# Patient Record
Sex: MALE | Race: BLACK OR AFRICAN AMERICAN | NOT HISPANIC OR LATINO | URBAN - METROPOLITAN AREA
[De-identification: names, ages, dates, MRNs, and addresses within clinical notes are randomized per-mention and may not be internally consistent; named-entity substitution may affect disease eponyms.]

---

## 2023-04-06 ENCOUNTER — HOSPITAL ENCOUNTER (EMERGENCY)
Facility: HOSPITAL | Age: 48
Discharge: HOME | End: 2023-04-06
Attending: SPECIALIST
Payer: MEDICAID

## 2023-04-06 VITALS
BODY MASS INDEX: 21.48 KG/M2 | TEMPERATURE: 98 F | OXYGEN SATURATION: 96 % | WEIGHT: 145 LBS | HEART RATE: 105 BPM | DIASTOLIC BLOOD PRESSURE: 89 MMHG | RESPIRATION RATE: 18 BRPM | HEIGHT: 69 IN | SYSTOLIC BLOOD PRESSURE: 134 MMHG

## 2023-04-06 DIAGNOSIS — R19.7 DIARRHEA, UNSPECIFIED TYPE: Primary | ICD-10-CM

## 2023-04-06 PROCEDURE — 99282 EMERGENCY DEPT VISIT SF MDM: CPT

## 2023-04-06 NOTE — ED PROVIDER NOTES
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   Review of patient's allergies indicates:  No Known Allergies  No past medical history on file.  No past surgical history on file.  No family history on file.     Review of Systems   Constitutional: Negative.    HENT: Negative.     Respiratory: Negative.     Cardiovascular: Negative.    Gastrointestinal: Negative.    Genitourinary: Negative.    Musculoskeletal: Negative.    Neurological: Negative.      Physical Exam     Initial Vitals [04/06/23 0141]   BP Pulse Resp Temp SpO2   134/89 105 18 98.2 °F (36.8 °C) 96 %      MAP       --         Physical Exam    Nursing note and vitals reviewed.  Constitutional: He appears well-developed and well-nourished.   HENT:   Head: Normocephalic and atraumatic.   Eyes: EOM are normal. Pupils are equal, round, and reactive to light.   Neck: Neck supple.   Normal range of motion.  Cardiovascular:  Normal rate, regular rhythm and normal heart sounds.           Pulmonary/Chest: Breath sounds normal.   Abdominal: Abdomen is soft. He exhibits no distension. There is no abdominal tenderness. There is no guarding.   Musculoskeletal:         General: Normal range of motion.      Cervical back: Normal range of motion and neck supple.     Neurological: He is alert and oriented to person, place, and time.   Skin: Skin is warm and dry.       ED Course   Procedures  Labs Reviewed - No data to display       Imaging Results    None          Medications - No data to display  Medical Decision Making:   Differential Diagnosis:   Viral gastroenteritis, food  toxicity  ED Management:  Symptoms improved; discussed signs and symptoms of bacterial infectious diarrhea including blood in stool and fever and need to return to the emergency room if such symptoms develop                        Clinical Impression:   Final diagnoses:  [R19.7] Diarrhea, unspecified type (Primary)        ED Disposition Condition    Discharge Stable          ED Prescriptions    None       Follow-up Information       Follow up With Specialties Details Why Contact Info    [facility] Emergency Medicine  As needed [address] [facility] [id number] [phone]             Tom Ibarra MD  04/06/23 2879

## 2023-04-06 NOTE — Clinical Note
"Yamile "Katparish Greene was seen and treated in our emergency department on 4/6/2023.  He may return to work on 04/10/2023.       If you have any questions or concerns, please don't hesitate to call.      Tom Ibarra MD"

## 2023-07-12 ENCOUNTER — HOSPITAL ENCOUNTER (EMERGENCY)
Facility: HOSPITAL | Age: 48
Discharge: HOME | End: 2023-07-12
Attending: SPECIALIST
Payer: MEDICAID

## 2023-07-12 VITALS
BODY MASS INDEX: 22.22 KG/M2 | TEMPERATURE: 99 F | SYSTOLIC BLOOD PRESSURE: 112 MMHG | HEIGHT: 69 IN | DIASTOLIC BLOOD PRESSURE: 69 MMHG | HEART RATE: 98 BPM | WEIGHT: 150 LBS | OXYGEN SATURATION: 98 % | RESPIRATION RATE: 20 BRPM

## 2023-07-12 DIAGNOSIS — A08.4 VIRAL GASTROENTERITIS: Primary | ICD-10-CM

## 2023-07-12 PROCEDURE — 99282 EMERGENCY DEPT VISIT SF MDM: CPT

## 2023-07-12 NOTE — Clinical Note
"Yamile "Katparish Greene was seen and treated in our emergency department on 7/12/2023.  He may return to work on 07/13/2023.       If you have any questions or concerns, please don't hesitate to call.      Tom Ibarra MD"

## 2023-07-12 NOTE — ED PROVIDER NOTES
Encounter Date: 7/12/2023       History     Chief Complaint   Patient presents with    Diarrhea     Abd cramping and diarrhea for 3 days. No meds for symptoms. No n/v, no fever.      Patient states he had abdominal cramping with diarrhea and nausea over the last 3 days but is better and needs a work excuse; no fever, no abdominal pain    The history is provided by the patient.   Review of patient's allergies indicates:  No Known Allergies  No past medical history on file.  No past surgical history on file.  No family history on file.     Review of Systems   Constitutional: Negative.    HENT: Negative.     Respiratory: Negative.     Cardiovascular: Negative.    Gastrointestinal: Negative.    Genitourinary: Negative.    Musculoskeletal: Negative.    Neurological: Negative.      Physical Exam     Initial Vitals [07/12/23 1742]   BP Pulse Resp Temp SpO2   112/69 98 20 98.8 °F (37.1 °C) 98 %      MAP       --         Physical Exam    Nursing note and vitals reviewed.  Constitutional: He appears well-developed and well-nourished.   HENT:   Head: Normocephalic and atraumatic.   Eyes: EOM are normal. Pupils are equal, round, and reactive to light.   Neck: Neck supple.   Normal range of motion.  Cardiovascular:  Normal rate, regular rhythm and normal heart sounds.           Pulmonary/Chest: Breath sounds normal.   Abdominal: Abdomen is soft. There is no abdominal tenderness.   Musculoskeletal:         General: Normal range of motion.      Cervical back: Normal range of motion and neck supple.     Neurological: He is alert and oriented to person, place, and time.   Skin: Skin is warm and dry.       ED Course   Procedures  Labs Reviewed - No data to display       Imaging Results    None          Medications - No data to display  Medical Decision Making:   Initial Assessment:   Patient states he had abdominal cramping with diarrhea and nausea over the last 3 days but is better and needs a work excuse; no fever, no abdominal  pain  Differential Diagnosis:   Recent viral gastroenteritis  ED Management:  Patient improved prior to arrival and needs a work excuse; encouraged hydration, Pepto-Bismol as needed                        Clinical Impression:   Final diagnoses:  [A08.4] Viral gastroenteritis (Primary)        ED Disposition Condition    Discharge Stable          ED Prescriptions    None       Follow-up Information       Follow up With Specialties Details Why Contact Info    [facility]  As needed [address] [facility] [id number] [phone]             Tom Ibarra MD  07/12/23 6955

## 2023-09-26 ENCOUNTER — HOSPITAL ENCOUNTER (EMERGENCY)
Facility: HOSPITAL | Age: 48
Discharge: HOME | End: 2023-09-26
Attending: SPECIALIST
Payer: MEDICAID

## 2023-09-26 VITALS
OXYGEN SATURATION: 97 % | DIASTOLIC BLOOD PRESSURE: 76 MMHG | HEIGHT: 69 IN | SYSTOLIC BLOOD PRESSURE: 124 MMHG | WEIGHT: 150 LBS | BODY MASS INDEX: 22.22 KG/M2 | HEART RATE: 78 BPM | TEMPERATURE: 99 F | RESPIRATION RATE: 18 BRPM

## 2023-09-26 DIAGNOSIS — Z02.89 ENCOUNTER TO OBTAIN EXCUSE FROM WORK: Primary | ICD-10-CM

## 2023-09-26 PROCEDURE — 99282 EMERGENCY DEPT VISIT SF MDM: CPT

## 2023-09-26 NOTE — ED PROVIDER NOTES
Encounter Date: 9/26/2023       History     Chief Complaint   Patient presents with    Letter for School/Work     Reports he had a virus yesterday so he missed work. Reports he is here to get a work excuse     Patient presents emergency room for return to work    The history is provided by the patient. No  was used.     Review of patient's allergies indicates:  No Known Allergies  History reviewed. No pertinent past medical history.  Past Surgical History:   Procedure Laterality Date    TONSILLECTOMY       No family history on file.  Social History     Tobacco Use    Smoking status: Every Day     Types: Cigarettes    Smokeless tobacco: Never     Review of Systems   Constitutional: Negative.    HENT: Negative.     Eyes: Negative.    Respiratory: Negative.     Cardiovascular: Negative.    Gastrointestinal: Negative.    Endocrine: Negative.    Genitourinary: Negative.    Musculoskeletal: Negative.    Skin: Negative.    Allergic/Immunologic: Negative.    Neurological: Negative.    Hematological: Negative.    Psychiatric/Behavioral: Negative.     All other systems reviewed and are negative.      Physical Exam     Initial Vitals [09/26/23 1814]   BP Pulse Resp Temp SpO2   124/76 78 18 98.5 °F (36.9 °C) 97 %      MAP       --         Physical Exam    Nursing note and vitals reviewed.  Constitutional: He appears well-developed and well-nourished.   HENT:   Head: Normocephalic and atraumatic.   Right Ear: External ear normal.   Left Ear: External ear normal.   Nose: Nose normal.   Mouth/Throat: Oropharynx is clear and moist.   Eyes: Conjunctivae and EOM are normal. Pupils are equal, round, and reactive to light.   Neck: Neck supple.   Normal range of motion.  Cardiovascular:  Normal rate, regular rhythm, normal heart sounds and intact distal pulses.           Pulmonary/Chest: Breath sounds normal.   Abdominal: Abdomen is soft. Bowel sounds are normal.   Musculoskeletal:         General: Normal range of  motion.      Cervical back: Normal range of motion and neck supple.     Neurological: He is alert and oriented to person, place, and time. He has normal strength and normal reflexes. GCS score is 15. GCS eye subscore is 4. GCS verbal subscore is 5. GCS motor subscore is 6.   Skin: Skin is warm and dry. Capillary refill takes less than 2 seconds.   Psychiatric: He has a normal mood and affect. His behavior is normal. Judgment and thought content normal.         ED Course   Procedures  Labs Reviewed - No data to display       Imaging Results    None          Medications - No data to display  Medical Decision Making  Awake alert and oriented no acute distress 48-year-old male presents to the emergency room for return to work excuse.  Patient denies any complaints.  All review of systems within normal limits.  GCS 15 cranial nerves 2-12 intact skin warm dry intact.                               Clinical Impression:   Final diagnoses:  [Z02.89] Encounter to obtain excuse from work (Primary)        ED Disposition Condition    Discharge Stable          ED Prescriptions    None       Follow-up Information    None          Mendy Tyler NP  09/26/23 1477

## 2023-09-26 NOTE — DISCHARGE INSTRUCTIONS
Patient to be discharged home.  Tylenol Motrin for any pain or discomfort.  Follow up with primary care provider as needed

## 2023-09-26 NOTE — Clinical Note
"Yamile "Katparish Greene was seen and treated in our emergency department on 9/26/2023.  He may return to work on 09/27/2023.       If you have any questions or concerns, please don't hesitate to call.      Tom Ibarra MD"

## 2024-05-29 ENCOUNTER — HOSPITAL ENCOUNTER (EMERGENCY)
Facility: HOSPITAL | Age: 49
Discharge: HOME | End: 2024-05-29
Attending: STUDENT IN AN ORGANIZED HEALTH CARE EDUCATION/TRAINING PROGRAM
Payer: COMMERCIAL

## 2024-05-29 ENCOUNTER — TELEPHONE (OUTPATIENT)
Dept: FAMILY MEDICINE | Facility: CLINIC | Age: 49
End: 2024-05-29
Payer: COMMERCIAL

## 2024-05-29 VITALS
HEIGHT: 69 IN | WEIGHT: 147 LBS | BODY MASS INDEX: 21.77 KG/M2 | TEMPERATURE: 98 F | SYSTOLIC BLOOD PRESSURE: 107 MMHG | RESPIRATION RATE: 18 BRPM | OXYGEN SATURATION: 96 % | HEART RATE: 69 BPM | DIASTOLIC BLOOD PRESSURE: 71 MMHG

## 2024-05-29 DIAGNOSIS — N43.3 HYDROCELE, UNSPECIFIED HYDROCELE TYPE: ICD-10-CM

## 2024-05-29 DIAGNOSIS — N50.89 TESTICULAR SWELLING: Primary | ICD-10-CM

## 2024-05-29 DIAGNOSIS — N45.3 EPIDIDYMO-ORCHITIS: ICD-10-CM

## 2024-05-29 LAB
BACTERIA #/AREA URNS AUTO: NORMAL /HPF
BILIRUB UR QL STRIP.AUTO: NEGATIVE
C TRACH DNA SPEC QL NAA+PROBE: NOT DETECTED
CLARITY UR: CLEAR
COLOR UR AUTO: YELLOW
GLUCOSE UR QL STRIP: NEGATIVE
HGB UR QL STRIP: NEGATIVE
KETONES UR QL STRIP: NEGATIVE
LEUKOCYTE ESTERASE UR QL STRIP: NEGATIVE
N GONORRHOEA DNA SPEC QL NAA+PROBE: NOT DETECTED
NITRITE UR QL STRIP: NEGATIVE
PH UR STRIP: 7 [PH]
PROT UR QL STRIP: ABNORMAL
RBC #/AREA URNS AUTO: NORMAL /HPF
SOURCE (OHS): NORMAL
SP GR UR STRIP.AUTO: 1.02 (ref 1–1.03)
SQUAMOUS #/AREA URNS AUTO: NORMAL /HPF
UROBILINOGEN UR STRIP-ACNC: 1
WBC #/AREA URNS AUTO: NORMAL /HPF

## 2024-05-29 PROCEDURE — 63700000 PHARM REV CODE 250 ALT 637 W/O HCPCS: Performed by: STUDENT IN AN ORGANIZED HEALTH CARE EDUCATION/TRAINING PROGRAM

## 2024-05-29 PROCEDURE — 96365 THER/PROPH/DIAG IV INF INIT: CPT

## 2024-05-29 PROCEDURE — 99285 EMERGENCY DEPT VISIT HI MDM: CPT | Mod: 25

## 2024-05-29 PROCEDURE — 81003 URINALYSIS AUTO W/O SCOPE: CPT | Performed by: STUDENT IN AN ORGANIZED HEALTH CARE EDUCATION/TRAINING PROGRAM

## 2024-05-29 PROCEDURE — 87491 CHLMYD TRACH DNA AMP PROBE: CPT | Performed by: STUDENT IN AN ORGANIZED HEALTH CARE EDUCATION/TRAINING PROGRAM

## 2024-05-29 PROCEDURE — 87591 N.GONORRHOEAE DNA AMP PROB: CPT | Performed by: STUDENT IN AN ORGANIZED HEALTH CARE EDUCATION/TRAINING PROGRAM

## 2024-05-29 PROCEDURE — 63600175 PHARM REV CODE 636 W HCPCS: Performed by: STUDENT IN AN ORGANIZED HEALTH CARE EDUCATION/TRAINING PROGRAM

## 2024-05-29 PROCEDURE — 25000003 PHARM REV CODE 250: Performed by: STUDENT IN AN ORGANIZED HEALTH CARE EDUCATION/TRAINING PROGRAM

## 2024-05-29 PROCEDURE — 81001 URINALYSIS AUTO W/SCOPE: CPT | Performed by: STUDENT IN AN ORGANIZED HEALTH CARE EDUCATION/TRAINING PROGRAM

## 2024-05-29 RX ORDER — AZITHROMYCIN 500 MG/1
500 TABLET, FILM COATED ORAL DAILY
Qty: 9 TABLET | Refills: 0 | Status: SHIPPED | OUTPATIENT
Start: 2024-05-29 | End: 2024-05-29 | Stop reason: CLARIF

## 2024-05-29 RX ORDER — AZITHROMYCIN 250 MG/1
500 TABLET, FILM COATED ORAL
Status: COMPLETED | OUTPATIENT
Start: 2024-05-29 | End: 2024-05-29

## 2024-05-29 RX ORDER — DOXYCYCLINE 100 MG/1
100 CAPSULE ORAL 2 TIMES DAILY
Qty: 20 CAPSULE | Refills: 0 | Status: SHIPPED | OUTPATIENT
Start: 2024-05-29 | End: 2024-06-03

## 2024-05-29 RX ADMIN — CEFTRIAXONE SODIUM 1 G: 1 INJECTION, POWDER, FOR SOLUTION INTRAMUSCULAR; INTRAVENOUS at 02:05

## 2024-05-29 RX ADMIN — AZITHROMYCIN DIHYDRATE 500 MG: 250 TABLET ORAL at 02:05

## 2024-05-29 NOTE — ED PROVIDER NOTES
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  States that he was happened 5 days ago when [place].  He denies any trauma to the testicle itself.  He denies any penile discharge or blood in the urine.  He was sexually active right [place].  He denies his partner having any sort of STI recently.  No fevers either.        Review of patient's allergies indicates:  No Known Allergies  No past medical history on file.  Past Surgical History:   Procedure Laterality Date    TONSILLECTOMY       No family history on file.  Social History     Tobacco Use    Smoking status: Every Day     Types: Cigarettes    Smokeless tobacco: Never     Review of Systems   Constitutional:  Negative for fever.   HENT:  Negative for sore throat.    Respiratory:  Negative for shortness of breath.    Cardiovascular:  Negative for chest pain.   Gastrointestinal:  Negative for nausea.   Genitourinary:  Positive for scrotal swelling. Negative for dysuria.   Musculoskeletal:  Negative for back pain.   Skin:  Negative for rash.   Neurological:  Negative for weakness.   Hematological:  Does not bruise/bleed easily.       Physical Exam     Initial Vitals [05/29/24 1216]   BP Pulse Resp Temp SpO2   (!) 128/94 88 16 98.4 °F (36.9 °C) 97 %      MAP       --         Physical Exam    Constitutional: He appears well-developed and well-nourished. He is not diaphoretic. No distress.   HENT:   Head: Normocephalic and atraumatic.   Eyes: Conjunctivae and EOM are normal.  Pupils are equal, round, and reactive to light.   Neck: No thyromegaly present. No tracheal deviation present.   Normal range of motion.  Cardiovascular:  Normal rate and regular rhythm.     Exam reveals no gallop and no friction rub.       No murmur heard.  Pulmonary/Chest: Breath sounds normal. No stridor. No respiratory distress. He has no wheezes. He has no rhonchi. He has no rales.   Abdominal: Abdomen is soft. Bowel sounds are normal. He exhibits no distension. There is no abdominal tenderness.   Genitourinary:    Genitourinary Comments: Performed with a chaperone in the room.  Left testicular swelling appreciated with tenderness at the head at the epididymal region.  No penile discharge noted.  No erythema noted.  No crepitus noted.     Musculoskeletal:         General: Normal range of motion.      Cervical back: Normal range of motion.     Neurological: He is alert and oriented to person, place, and time.   Skin: Skin is warm. Capillary refill takes less than 2 seconds.   Psychiatric: He has a normal mood and affect.         ED Course   Procedures  Labs Reviewed   URINALYSIS, REFLEX TO URINE CULTURE - Abnormal; Notable for the following components:       Result Value    Protein, UA Trace (*)     All other components within normal limits   URINALYSIS, MICROSCOPIC - Normal   CHLAMYDIA/GONORRHOEAE(GC), PCR    Narrative:     The Xpert CT/NG test, performed on the GeneXpert system is a qualitative in vitro real-time polymerase chain reaction (PCR) test for the automated detected and differentiation for genomic DNA from Chlamydia trachomatis (CT) and/or Neisseria gonorrhoeae (NG).          Imaging Results              US Scrotum And Testicles (Final result)  Result time 05/29/24 13:15:27      Final result by Silvino Thomas MD (05/29/24 13:15:27)                   Impression:      Left epididymoorchitis with complex left hydrocele.      Electronically signed by: Silvino Thomas  Date:    05/29/2024  Time:    13:15                Narrative:    EXAMINATION:  US SCROTUM AND TESTICLES    CLINICAL HISTORY:  Other specified disorders of the male genital organs    TECHNIQUE:  Gray-scale, color Doppler, and spectral waveform tracings of the scrotum.    COMPARISON:  No relevant comparison studies available at the time of dictation.    FINDINGS:  No intratesticular mass identified. Hypervascular left testicle and left epididymis with left epididymal enlargement.  Septated small volume left hydrocele.    Measurements:    - right testicle: 3.6 x 1.8 x 2.5 cm    - left testicle: 3.7 x 2.9 x 2.9 cm                                       Medications   cefTRIAXone (Rocephin) 1 g in dextrose 5 % in water (D5W) 100 mL IVPB (MB+) (0 g Intravenous Stopped 5/29/24 1518)   azithromycin tablet 500 mg (500 mg Oral Given 5/29/24 1449)     Medical Decision Making  49 year old male here with testicular swelling   Diff includes torsion, epididymitis, orchitis, abscess, hydrocele, malignancy   U/S showed evidence of epididymitis   Have given treatment here with rocephin/azithro.  Plan to discharge with doxy po bid   Also, since there is a small septated hydrocele, did tell patient this will require a urology follow up, he stated that his wife already made him an appointment for this Friday.     At this time, I have discussed the diagnoses with the patient and they have verbalized understanding back to me.  I have also discussed the steps to take at home going forward.  They agree with the plan.  After the discussion, all their questions were appropriately answered.  I have also discussed strict return precautions with the patient.     Aislinn Denton MD         Amount and/or Complexity of Data Reviewed  Radiology: ordered.    Risk  Prescription drug management.  Diagnosis or treatment significantly limited by social determinants of health.                                      Clinical Impression:  Final diagnoses:  [N50.89] Testicular swelling  (Primary)  [N45.3] Epididymo-orchitis  [N43.3] Hydrocele, unspecified hydrocele type          ED Disposition Condition    Discharge Stable          ED Prescriptions       Medication Sig Dispense Start Date End Date Auth. Provider    azithromycin (ZITHROMAX) 500 MG tablet  (Status: Discontinued) Take 1 tablet (500 mg total) by mouth once daily. for 9 days 9 tablet 5/29/2024 5/29/2024 Aislinn Denton MD    doxycycline (VIBRAMYCIN) 100 MG Cap Take 1 capsule (100 mg total) by mouth 2 (two) times daily. for 10 days 20 capsule 5/29/2024 6/8/2024 Aislinn Denton MD          Follow-up Information       Follow up With Specialties Details Why Contact Info    [facility] - Emergency Dept Emergency Medicine Go to  As needed, If symptoms worsen [address] [facility] [id number] [phone]             Aislinn Denton MD  05/30/24 5017

## 2024-05-29 NOTE — DISCHARGE INSTRUCTIONS
Return to the emergency department for any worsening or concerning symptoms.  Please follow up with the urologist as discussed on Friday.  Otherwise, please follow up with the primary care provider as well.

## 2024-06-03 ENCOUNTER — OFFICE VISIT (OUTPATIENT)
Dept: FAMILY MEDICINE | Facility: CLINIC | Age: 49
End: 2024-06-03
Payer: COMMERCIAL

## 2024-06-03 VITALS
HEART RATE: 90 BPM | BODY MASS INDEX: 20.44 KG/M2 | HEIGHT: 71 IN | SYSTOLIC BLOOD PRESSURE: 114 MMHG | WEIGHT: 146 LBS | DIASTOLIC BLOOD PRESSURE: 82 MMHG | TEMPERATURE: 98 F | RESPIRATION RATE: 20 BRPM | OXYGEN SATURATION: 99 %

## 2024-06-03 DIAGNOSIS — Z11.59 ENCOUNTER FOR HEPATITIS C SCREENING TEST FOR LOW RISK PATIENT: ICD-10-CM

## 2024-06-03 DIAGNOSIS — Z23 NEED FOR VACCINATION FOR STREP PNEUMONIAE: ICD-10-CM

## 2024-06-03 DIAGNOSIS — Z76.89 ENCOUNTER TO ESTABLISH CARE: ICD-10-CM

## 2024-06-03 DIAGNOSIS — Z00.00 WELLNESS EXAMINATION: ICD-10-CM

## 2024-06-03 DIAGNOSIS — Z11.4 ENCOUNTER FOR SCREENING FOR HIV: ICD-10-CM

## 2024-06-03 DIAGNOSIS — N45.3 EPIDIDYMOORCHITIS: ICD-10-CM

## 2024-06-03 DIAGNOSIS — Z23 NEED FOR TETANUS, DIPHTHERIA, AND ACELLULAR PERTUSSIS (TDAP) VACCINE: ICD-10-CM

## 2024-06-03 DIAGNOSIS — Z12.11 ENCOUNTER FOR SCREENING FOR MALIGNANT NEOPLASM OF COLON: ICD-10-CM

## 2024-06-03 DIAGNOSIS — Z13.1 ENCOUNTER FOR SCREENING FOR DIABETES MELLITUS: ICD-10-CM

## 2024-06-03 DIAGNOSIS — Z13.29 SCREENING FOR THYROID DISORDER: ICD-10-CM

## 2024-06-03 DIAGNOSIS — Z00.00 WELL ADULT HEALTH CHECK: ICD-10-CM

## 2024-06-03 DIAGNOSIS — K59.00 CONSTIPATION, UNSPECIFIED CONSTIPATION TYPE: ICD-10-CM

## 2024-06-03 DIAGNOSIS — Z13.6 ENCOUNTER FOR LIPID SCREENING FOR CARDIOVASCULAR DISEASE: ICD-10-CM

## 2024-06-03 DIAGNOSIS — N43.3 HYDROCELE IN ADULT: ICD-10-CM

## 2024-06-03 DIAGNOSIS — Z13.220 ENCOUNTER FOR LIPID SCREENING FOR CARDIOVASCULAR DISEASE: ICD-10-CM

## 2024-06-03 PROCEDURE — 90715 TDAP VACCINE 7 YRS/> IM: CPT | Mod: ,,, | Performed by: STUDENT IN AN ORGANIZED HEALTH CARE EDUCATION/TRAINING PROGRAM

## 2024-06-03 PROCEDURE — 90472 IMMUNIZATION ADMIN EACH ADD: CPT | Mod: ,,, | Performed by: STUDENT IN AN ORGANIZED HEALTH CARE EDUCATION/TRAINING PROGRAM

## 2024-06-03 PROCEDURE — 90471 IMMUNIZATION ADMIN: CPT | Mod: ,,, | Performed by: STUDENT IN AN ORGANIZED HEALTH CARE EDUCATION/TRAINING PROGRAM

## 2024-06-03 PROCEDURE — 3008F BODY MASS INDEX DOCD: CPT | Mod: CPTII,,, | Performed by: STUDENT IN AN ORGANIZED HEALTH CARE EDUCATION/TRAINING PROGRAM

## 2024-06-03 PROCEDURE — 3074F SYST BP LT 130 MM HG: CPT | Mod: CPTII,,, | Performed by: STUDENT IN AN ORGANIZED HEALTH CARE EDUCATION/TRAINING PROGRAM

## 2024-06-03 PROCEDURE — 1159F MED LIST DOCD IN RCRD: CPT | Mod: CPTII,,, | Performed by: STUDENT IN AN ORGANIZED HEALTH CARE EDUCATION/TRAINING PROGRAM

## 2024-06-03 PROCEDURE — 3079F DIAST BP 80-89 MM HG: CPT | Mod: CPTII,,, | Performed by: STUDENT IN AN ORGANIZED HEALTH CARE EDUCATION/TRAINING PROGRAM

## 2024-06-03 PROCEDURE — 1160F RVW MEDS BY RX/DR IN RCRD: CPT | Mod: CPTII,,, | Performed by: STUDENT IN AN ORGANIZED HEALTH CARE EDUCATION/TRAINING PROGRAM

## 2024-06-03 PROCEDURE — 99386 PREV VISIT NEW AGE 40-64: CPT | Mod: 25,,, | Performed by: STUDENT IN AN ORGANIZED HEALTH CARE EDUCATION/TRAINING PROGRAM

## 2024-06-03 PROCEDURE — 90677 PCV20 VACCINE IM: CPT | Mod: ,,, | Performed by: STUDENT IN AN ORGANIZED HEALTH CARE EDUCATION/TRAINING PROGRAM

## 2024-06-03 PROCEDURE — 99406 BEHAV CHNG SMOKING 3-10 MIN: CPT | Mod: 25,,, | Performed by: STUDENT IN AN ORGANIZED HEALTH CARE EDUCATION/TRAINING PROGRAM

## 2024-06-03 RX ORDER — IBUPROFEN 200 MG
200 TABLET ORAL EVERY 6 HOURS PRN
COMMUNITY

## 2024-06-03 RX ORDER — AZITHROMYCIN 500 MG/1
500 TABLET, FILM COATED ORAL
COMMUNITY
Start: 2024-05-29

## 2024-06-03 RX ORDER — SULFAMETHOXAZOLE AND TRIMETHOPRIM 800; 160 MG/1; MG/1
1 TABLET ORAL
COMMUNITY

## 2024-06-03 RX ORDER — POLYETHYLENE GLYCOL 3350 17 G/17G
POWDER, FOR SOLUTION ORAL
COMMUNITY

## 2024-06-03 RX ORDER — DOCUSATE SODIUM 100 MG/1
100 CAPSULE, LIQUID FILLED ORAL 2 TIMES DAILY
Qty: 180 CAPSULE | Refills: 0 | Status: SHIPPED | OUTPATIENT
Start: 2024-06-03 | End: 2024-09-01

## 2024-06-03 NOTE — PROGRESS NOTES
Patient ID: [id number]     Chief Complaint: Establish Care        HPI:      Disclaimer:  This note is prepared using voice recognition software and as such is likely to have errors despite attempts at proofreading. Please contact me for questions.     Yamile Greene is a 49 y.o. male here today for an annual wellness.       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  The size of the testicle started increasing.  He recently had an ultrasound of scrotum which demonstrated epididymo-orchitis with hydrocele.  He saw Dr. Greene (urology) today.  Was prescribed azithromycin and doxycycline.  Did not see any improvement.  Dr. Greene as prescribed him Bactrim DS today.  Otherwise doing well.    Constipation  Has been taking on and off MiraLax    Smoking      Chronic issues-    No past medical history on file.     Past Surgical History:   Procedure Laterality Date    TONSILLECTOMY         Review of patient's allergies indicates:  No Known Allergies    Current Outpatient Medications   Medication Instructions    azithromycin (ZITHROMAX) 500 mg, Oral    doxycycline (VIBRAMYCIN) 100 mg, Oral, 2 times daily    ibuprofen (ADVIL,MOTRIN) 200 mg, Oral, Every 6 hours PRN    polyethylene glycol (GLYCOLAX) 17 gram PwPk Oral    sulfamethoxazole-trimethoprim 800-160mg (BACTRIM DS) 800-160 mg Tab 1 tablet, Oral, Every 12 hours (non-standard times)       Social History     Socioeconomic History    Marital status:    Tobacco Use    Smoking status: Some Days     Types: Cigarettes    Smokeless tobacco: Never   Substance and Sexual Activity    Alcohol use: Yes     Comment: occassionally    Drug use: Yes     Types: Cocaine    Sexual activity: Yes     Partners: Female        Family History   Problem Relation Name Age of Onset    Cancer Mother      Heart disease Mother      Depression Mother          Patient Care Team:  No, Primary Doctor as PCP -  "General     Subjective:     ROS    See HPI for details    Constitutional: Denies Change in appetite. Denies Chills. Denies Fever. Denies Night sweats.  Respiratory: Denies Cough. Denies Shortness of breath. Denies Shortness of breath with exertion. Denies Wheezing.  Cardiovascular: DeniesChest pain at rest. Denies Chest pain with exertion. Denies Irregular heartbeat. Denies Palpitations. Denies Edema.  Gastrointestinal: Denies Abdominal pain. DeniesDiarrhea. Denies Nausea. Denies Vomiting. Denies Hematemesis or Hematochezia.  Genitourinary: Denies Dysuria. Denies Urinary frequency. Denies Urinary urgency. Denies Blood in urine.  Psychiatric: Denies Depression. Denies Anxiety. Denies Suicidal/Homicidal ideations.    All Other ROS: Negative except as stated in HPI.       Objective:     Visit Vitals  /82 (BP Location: Right arm, Patient Position: Sitting, BP Method: Large (Automatic))   Pulse 90   Temp 98.1 °F (36.7 °C) (Oral)   Resp 20   Ht 5' 10.67" (1.795 m)   Wt 66.2 kg (146 lb)   SpO2 99%   BMI 20.55 kg/m²       Physical Exam    General: Alert and oriented, No acute distress.  Neck/Thyroid: Supple, Non-tender  Respiratory: Clear to auscultation bilaterally  Cardiovascular: Regular rate and rhythm  Gastrointestinal: Soft, Non-tender. No palpable organomegaly.  Musculoskeletal: Normal range of motion.  Neurologic: No focal deficits  Psychiatric: Normal interaction, Coherent speech, Euthymic mood, Appropriate affect     Assessment:       ICD-10-CM ICD-9-CM   1. Encounter to establish care  Z76.89 V65.8   2. Wellness examination  Z00.00 V70.0   3. Encounter for screening for diabetes mellitus  Z13.1 V77.1   4. Screening for thyroid disorder  Z13.29 V77.0   5. Encounter for lipid screening for cardiovascular disease  Z13.220 V77.91    Z13.6 V81.2        Plan:       Health Maintenance Topics with due status: Not Due       Topic Last Completion Date    Influenza Vaccine Not Due          Vaccinations -   There is no " immunization history on file for this patient.     1. Encounter to establish care  2. Wellness examination  -     CBC Auto Differential; Future; Expected date: 06/03/2024  -     Comprehensive Metabolic Panel; Future; Expected date: 06/03/2024  -     Urinalysis; Future; Expected date: 06/03/2024  -     PSA, Screening; Future; Expected date: 06/03/2024  AAA Screening -  (65-75)   Lung Cancer-(50-80) Smoker/ Ex smoker- smokes  Prostate Cancer Screening (50-74)- ordered  Colon Cancer Screening(45-75) - Cologuard ordered  Eye Exam - Recommend annually.   Dental Exam - Recommend biannual exams.     Diet discussed (healthy food choices, reduce portions and overall calorie intake)  Exercise 30-45 minutes 5x per week  Avoid excessive alcohol and tobacco if taking  Immunizations dicussed  Monthly testicular exam recommended  Preventative exams discussed.       3. Encounter for screening for diabetes mellitus  -     Hemoglobin A1C; Future; Expected date: 06/03/2024    4. Screening for thyroid disorder  -     TSH; Future; Expected date: 06/03/2024    5. Encounter for lipid screening for cardiovascular disease  -     Lipid Panel; Future; Expected date: 06/03/2024    6. Encounter for screening for HIV  -     HIV 1/2 Ag/Ab (4th Gen); Future; Expected date: 06/03/2024    7. Encounter for hepatitis C screening test for low risk patient  -     Hepatitis C Antibody; Future; Expected date: 06/03/2024    8. Epididymoorchitis  9. Hydrocele in adult  Continue antibiotics as prescribed   Keep scheduled visit with Dr. Mendez his   ER precautions provided    10. Need for vaccination for Strep pneumoniae  -     Pneumococcal Conjugate Vaccine (20 Valent) (IM)(Preferred)    11. Need for tetanus, diphtheria, and acellular pertussis (Tdap) vaccine  -     Tdap Vaccine    12. Encounter for screening for malignant neoplasm of colon  -     Cologuard Screening (Multitarget Stool DNA); Future; Expected date: 06/03/2024    13. Constipation, unspecified  constipation type  -     docusate sodium (COLACE) 100 MG capsule; Take 1 capsule (100 mg total) by mouth 2 (two) times daily.  Dispense: 180 capsule; Refill: 0    14. Well adult health check  -     CBC Auto Differential; Future; Expected date: 06/03/2025  -     Comprehensive Metabolic Panel; Future; Expected date: 06/03/2025  -     Lipid Panel; Future; Expected date: 06/03/2025  -     TSH; Future; Expected date: 06/03/2025  -     Hemoglobin A1C; Future; Expected date: 06/03/2025  -     Urinalysis; Future; Expected date: 06/03/2025          Medication List with Changes/Refills   Current Medications    AZITHROMYCIN (ZITHROMAX) 500 MG TABLET    Take 500 mg by mouth.       Start Date: 5/29/2024 End Date: --    DOXYCYCLINE (VIBRAMYCIN) 100 MG CAP    Take 1 capsule (100 mg total) by mouth 2 (two) times daily. for 10 days       Start Date: 5/29/2024 End Date: 6/8/2024    IBUPROFEN (ADVIL,MOTRIN) 200 MG TABLET    Take 200 mg by mouth every 6 (six) hours as needed for Pain.       Start Date: --        End Date: --    POLYETHYLENE GLYCOL (GLYCOLAX) 17 GRAM PWPK    Take by mouth.       Start Date: --        End Date: --    SULFAMETHOXAZOLE-TRIMETHOPRIM 800-160MG (BACTRIM DS) 800-160 MG TAB    Take 1 tablet by mouth every 12 (twelve) hours.       Start Date: --        End Date: --          The patient's Health Maintenance was reviewed and the following appears to be due at this time:   Health Maintenance Due   Topic Date Due    Hepatitis C Screening  Never done    Lipid Panel  Never done    Pneumococcal Vaccines (Age 0-64) (1 of 2 - PCV) Never done    HIV Screening  Never done    TETANUS VACCINE  Never done    Colorectal Cancer Screening  Never done    COVID-19 Vaccine (3 - 2023-24 season) 09/01/2023       Follow up in about 1 year (around 6/3/2025) for Annual Wellness.   In addition to their scheduled follow up, the patient has also been instructed to follow up on as needed basis.

## 2024-06-04 ENCOUNTER — LAB VISIT (OUTPATIENT)
Dept: LAB | Facility: HOSPITAL | Age: 49
End: 2024-06-04
Attending: STUDENT IN AN ORGANIZED HEALTH CARE EDUCATION/TRAINING PROGRAM
Payer: COMMERCIAL

## 2024-06-04 DIAGNOSIS — Z13.220 ENCOUNTER FOR LIPID SCREENING FOR CARDIOVASCULAR DISEASE: ICD-10-CM

## 2024-06-04 DIAGNOSIS — D64.9 ANEMIA, UNSPECIFIED TYPE: Primary | ICD-10-CM

## 2024-06-04 DIAGNOSIS — Z13.29 SCREENING FOR THYROID DISORDER: ICD-10-CM

## 2024-06-04 DIAGNOSIS — R77.1 ELEVATED SERUM GLOBULIN LEVEL: ICD-10-CM

## 2024-06-04 DIAGNOSIS — Z00.00 WELLNESS EXAMINATION: ICD-10-CM

## 2024-06-04 DIAGNOSIS — Z11.59 ENCOUNTER FOR HEPATITIS C SCREENING TEST FOR LOW RISK PATIENT: ICD-10-CM

## 2024-06-04 DIAGNOSIS — Z11.4 ENCOUNTER FOR SCREENING FOR HIV: ICD-10-CM

## 2024-06-04 DIAGNOSIS — Z13.6 ENCOUNTER FOR LIPID SCREENING FOR CARDIOVASCULAR DISEASE: ICD-10-CM

## 2024-06-04 LAB
ALBUMIN SERPL-MCNC: 3.9 G/DL (ref 3.5–5)
ALBUMIN/GLOB SERPL: 0.8 RATIO (ref 1.1–2)
ALP SERPL-CCNC: 61 UNIT/L (ref 40–150)
ALT SERPL-CCNC: 14 UNIT/L (ref 0–55)
ANION GAP SERPL CALC-SCNC: 8 MEQ/L
AST SERPL-CCNC: 13 UNIT/L (ref 5–34)
BASOPHILS # BLD AUTO: 0.04 X10(3)/MCL
BASOPHILS NFR BLD AUTO: 1 %
BILIRUB SERPL-MCNC: 0.4 MG/DL
BUN SERPL-MCNC: 15.6 MG/DL (ref 8.9–20.6)
CALCIUM SERPL-MCNC: 9.7 MG/DL (ref 8.4–10.2)
CHLORIDE SERPL-SCNC: 104 MMOL/L (ref 98–107)
CHOLEST SERPL-MCNC: 133 MG/DL
CHOLEST/HDLC SERPL: 5 {RATIO} (ref 0–5)
CO2 SERPL-SCNC: 28 MMOL/L (ref 22–29)
CREAT SERPL-MCNC: 0.97 MG/DL (ref 0.73–1.18)
CREAT/UREA NIT SERPL: 16
EOSINOPHIL # BLD AUTO: 0.15 X10(3)/MCL (ref 0–0.9)
EOSINOPHIL NFR BLD AUTO: 3.7 %
ERYTHROCYTE [DISTWIDTH] IN BLOOD BY AUTOMATED COUNT: 12.6 % (ref 11.5–17)
EST. AVERAGE GLUCOSE BLD GHB EST-MCNC: 114 MG/DL
GFR SERPLBLD CREATININE-BSD FMLA CKD-EPI: >60 ML/MIN/1.73/M2
GLOBULIN SER-MCNC: 4.6 GM/DL (ref 2.4–3.5)
GLUCOSE SERPL-MCNC: 83 MG/DL (ref 74–100)
HBA1C MFR BLD: 5.6 %
HCT VFR BLD AUTO: 39.2 % (ref 42–52)
HCV AB SERPL QL IA: NONREACTIVE
HDLC SERPL-MCNC: 29 MG/DL (ref 35–60)
HGB BLD-MCNC: 12.8 G/DL (ref 14–18)
HIV 1+2 AB+HIV1 P24 AG SERPL QL IA: NONREACTIVE
IMM GRANULOCYTES # BLD AUTO: 0.02 X10(3)/MCL (ref 0–0.04)
IMM GRANULOCYTES NFR BLD AUTO: 0.5 %
LDLC SERPL CALC-MCNC: 89 MG/DL (ref 50–140)
LYMPHOCYTES # BLD AUTO: 1.49 X10(3)/MCL (ref 0.6–4.6)
LYMPHOCYTES NFR BLD AUTO: 36.4 %
MCH RBC QN AUTO: 28.7 PG (ref 27–31)
MCHC RBC AUTO-ENTMCNC: 32.7 G/DL (ref 33–36)
MCV RBC AUTO: 87.9 FL (ref 80–94)
MONOCYTES # BLD AUTO: 0.54 X10(3)/MCL (ref 0.1–1.3)
MONOCYTES NFR BLD AUTO: 13.2 %
NEUTROPHILS # BLD AUTO: 1.85 X10(3)/MCL (ref 2.1–9.2)
NEUTROPHILS NFR BLD AUTO: 45.2 %
NRBC BLD AUTO-RTO: 0 %
PLATELET # BLD AUTO: 470 X10(3)/MCL (ref 130–400)
PMV BLD AUTO: 8.2 FL (ref 7.4–10.4)
POTASSIUM SERPL-SCNC: 4.4 MMOL/L (ref 3.5–5.1)
PROT SERPL-MCNC: 8.5 GM/DL (ref 6.4–8.3)
PSA SERPL-MCNC: 3.14 NG/ML
RBC # BLD AUTO: 4.46 X10(6)/MCL (ref 4.7–6.1)
SODIUM SERPL-SCNC: 140 MMOL/L (ref 136–145)
TRIGL SERPL-MCNC: 77 MG/DL (ref 34–140)
TSH SERPL-ACNC: 1.56 UIU/ML (ref 0.35–4.94)
VLDLC SERPL CALC-MCNC: 15 MG/DL
WBC # SPEC AUTO: 4.09 X10(3)/MCL (ref 4.5–11.5)

## 2024-06-04 PROCEDURE — 85025 COMPLETE CBC W/AUTO DIFF WBC: CPT

## 2024-06-04 PROCEDURE — 80053 COMPREHEN METABOLIC PANEL: CPT

## 2024-06-04 PROCEDURE — 86803 HEPATITIS C AB TEST: CPT

## 2024-06-04 PROCEDURE — 84443 ASSAY THYROID STIM HORMONE: CPT

## 2024-06-04 PROCEDURE — 80061 LIPID PANEL: CPT

## 2024-06-04 PROCEDURE — 87389 HIV-1 AG W/HIV-1&-2 AB AG IA: CPT

## 2024-06-04 PROCEDURE — 83036 HEMOGLOBIN GLYCOSYLATED A1C: CPT

## 2024-06-04 PROCEDURE — 84153 ASSAY OF PSA TOTAL: CPT

## 2024-06-04 PROCEDURE — 36415 COLL VENOUS BLD VENIPUNCTURE: CPT

## 2024-06-04 NOTE — PROGRESS NOTES
Please inform patient of lab results.    1. Elevated globulin level  Patient is currently asymptomatic  Work up in computer  2. Low HDL level   Recommend to increase physical activity, incorporate Mediterranean diet including salmon, tuna fish is walnuts means and berries  Can take over-the-counter fish oil    3. Anemia  Workup in the computer    @ [place]   Please schedule a video visit for anemia in 3 months      Other labwork within acceptable ranges.    Thanks,    Dr. Fang

## 2024-06-05 ENCOUNTER — TELEPHONE (OUTPATIENT)
Dept: FAMILY MEDICINE | Facility: CLINIC | Age: 49
End: 2024-06-05
Payer: COMMERCIAL

## 2024-06-05 ENCOUNTER — OFFICE VISIT (OUTPATIENT)
Dept: FAMILY MEDICINE | Facility: CLINIC | Age: 49
End: 2024-06-05
Payer: COMMERCIAL

## 2024-06-05 ENCOUNTER — LAB VISIT (OUTPATIENT)
Dept: LAB | Facility: HOSPITAL | Age: 49
End: 2024-06-05
Attending: STUDENT IN AN ORGANIZED HEALTH CARE EDUCATION/TRAINING PROGRAM
Payer: COMMERCIAL

## 2024-06-05 VITALS
SYSTOLIC BLOOD PRESSURE: 134 MMHG | HEIGHT: 71 IN | HEART RATE: 87 BPM | DIASTOLIC BLOOD PRESSURE: 87 MMHG | TEMPERATURE: 99 F | OXYGEN SATURATION: 98 % | BODY MASS INDEX: 20.44 KG/M2 | WEIGHT: 146 LBS | RESPIRATION RATE: 20 BRPM

## 2024-06-05 DIAGNOSIS — R79.82 ELEVATED C-REACTIVE PROTEIN (CRP): ICD-10-CM

## 2024-06-05 DIAGNOSIS — R77.1 ELEVATED SERUM GLOBULIN LEVEL: ICD-10-CM

## 2024-06-05 DIAGNOSIS — D72.819 LEUKOPENIA, UNSPECIFIED TYPE: ICD-10-CM

## 2024-06-05 DIAGNOSIS — D64.9 ANEMIA, UNSPECIFIED TYPE: ICD-10-CM

## 2024-06-05 DIAGNOSIS — E53.8 FOLATE DEFICIENCY: ICD-10-CM

## 2024-06-05 DIAGNOSIS — F19.10 DRUG ABUSE: ICD-10-CM

## 2024-06-05 LAB
CRP SERPL-MCNC: 19.7 MG/L
ERYTHROCYTE [SEDIMENTATION RATE] IN BLOOD: 47 MM/HR (ref 0–15)
FERRITIN SERPL-MCNC: 482.36 NG/ML (ref 21.81–274.66)
FOLATE SERPL-MCNC: 6.7 NG/ML (ref 7–31.4)
HEMATOLOGIST REVIEW: NORMAL
IGA SERPL-MCNC: 395 MG/DL (ref 63–484)
IGG SERPL-MCNC: 2038 MG/DL (ref 540–1822)
IGM SERPL-MCNC: 81 MG/DL (ref 22–240)
IRON SATN MFR SERPL: 18 % (ref 20–50)
IRON SERPL-MCNC: 39 UG/DL (ref 65–175)
LDH SERPL-CCNC: 162 U/L (ref 125–220)
RET# (OHS): 0.04 X10E6/UL (ref 0.03–0.1)
RETICULOCYTE COUNT AUTOMATED (OLG): 0.85 % (ref 1.1–2.1)
RHEUMATOID FACT SERPL-ACNC: <13 IU/ML
TIBC SERPL-MCNC: 183 UG/DL (ref 69–240)
TIBC SERPL-MCNC: 222 UG/DL (ref 250–450)
TRANSFERRIN SERPL-MCNC: 198 MG/DL (ref 174–364)
VIT B12 SERPL-MCNC: 665 PG/ML (ref 213–816)

## 2024-06-05 PROCEDURE — 1160F RVW MEDS BY RX/DR IN RCRD: CPT | Mod: CPTII,,, | Performed by: STUDENT IN AN ORGANIZED HEALTH CARE EDUCATION/TRAINING PROGRAM

## 2024-06-05 PROCEDURE — 85652 RBC SED RATE AUTOMATED: CPT

## 2024-06-05 PROCEDURE — 86225 DNA ANTIBODY NATIVE: CPT

## 2024-06-05 PROCEDURE — 86235 NUCLEAR ANTIGEN ANTIBODY: CPT

## 2024-06-05 PROCEDURE — 3044F HG A1C LEVEL LT 7.0%: CPT | Mod: CPTII,,, | Performed by: STUDENT IN AN ORGANIZED HEALTH CARE EDUCATION/TRAINING PROGRAM

## 2024-06-05 PROCEDURE — 82746 ASSAY OF FOLIC ACID SERUM: CPT

## 2024-06-05 PROCEDURE — 86431 RHEUMATOID FACTOR QUANT: CPT

## 2024-06-05 PROCEDURE — 82728 ASSAY OF FERRITIN: CPT

## 2024-06-05 PROCEDURE — 85045 AUTOMATED RETICULOCYTE COUNT: CPT

## 2024-06-05 PROCEDURE — 1159F MED LIST DOCD IN RCRD: CPT | Mod: CPTII,,, | Performed by: STUDENT IN AN ORGANIZED HEALTH CARE EDUCATION/TRAINING PROGRAM

## 2024-06-05 PROCEDURE — 86431 RHEUMATOID FACTOR QUANT: CPT | Mod: 59

## 2024-06-05 PROCEDURE — 83540 ASSAY OF IRON: CPT

## 2024-06-05 PROCEDURE — 82784 ASSAY IGA/IGD/IGG/IGM EACH: CPT

## 2024-06-05 PROCEDURE — 3079F DIAST BP 80-89 MM HG: CPT | Mod: CPTII,,, | Performed by: STUDENT IN AN ORGANIZED HEALTH CARE EDUCATION/TRAINING PROGRAM

## 2024-06-05 PROCEDURE — 86039 ANTINUCLEAR ANTIBODIES (ANA): CPT

## 2024-06-05 PROCEDURE — 3008F BODY MASS INDEX DOCD: CPT | Mod: CPTII,,, | Performed by: STUDENT IN AN ORGANIZED HEALTH CARE EDUCATION/TRAINING PROGRAM

## 2024-06-05 PROCEDURE — 86140 C-REACTIVE PROTEIN: CPT

## 2024-06-05 PROCEDURE — 82607 VITAMIN B-12: CPT

## 2024-06-05 PROCEDURE — 3075F SYST BP GE 130 - 139MM HG: CPT | Mod: CPTII,,, | Performed by: STUDENT IN AN ORGANIZED HEALTH CARE EDUCATION/TRAINING PROGRAM

## 2024-06-05 PROCEDURE — 84165 PROTEIN E-PHORESIS SERUM: CPT

## 2024-06-05 PROCEDURE — 83521 IG LIGHT CHAINS FREE EACH: CPT

## 2024-06-05 PROCEDURE — G2211 COMPLEX E/M VISIT ADD ON: HCPCS | Mod: ,,, | Performed by: STUDENT IN AN ORGANIZED HEALTH CARE EDUCATION/TRAINING PROGRAM

## 2024-06-05 PROCEDURE — 83615 LACTATE (LD) (LDH) ENZYME: CPT

## 2024-06-05 PROCEDURE — 99214 OFFICE O/P EST MOD 30 MIN: CPT | Mod: ,,, | Performed by: STUDENT IN AN ORGANIZED HEALTH CARE EDUCATION/TRAINING PROGRAM

## 2024-06-05 PROCEDURE — 36415 COLL VENOUS BLD VENIPUNCTURE: CPT

## 2024-06-05 RX ORDER — FOLIC ACID 1 MG/1
1 TABLET ORAL DAILY
Qty: 90 TABLET | Refills: 0 | Status: SHIPPED | OUTPATIENT
Start: 2024-06-05 | End: 2024-09-03

## 2024-06-05 NOTE — TELEPHONE ENCOUNTER
----- Message from Polly Fang MD sent at 6/4/2024  4:47 PM CDT -----  Please inform patient of lab results.    1. Elevated globulin level  Patient is currently asymptomatic  Work up in computer  2. Low HDL level   Recommend to increase physical activity, incorporate Mediterranean diet including salmon, tuna fish is walnuts means and berries  Can take over-the-counter fish oil    3. Anemia  Workup in the computer    @ [place]   Please schedule a video visit for anemia in 3 months      Other labwork within acceptable ranges.    Thanks,    Dr. Fang

## 2024-06-05 NOTE — PROGRESS NOTES
Patient ID: [id number]     Chief Complaint: Blood Work Results        HPI:      Disclaimer:  This note is prepared using voice recognition software and as such is likely to have errors despite attempts at proofreading. Please contact me for questions.     Yamile Greene is a 49 y.o. male here today for the following    Acute Issues-  Is here to discuss the blood work results   Leukopenia  Anemia   Elevated globulin level   Folate deficiency  Patient reports that he is currently doing good.  Denies of any symptoms but always has been having low energy since past 2-3 years.  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  Also reports of abusing cocaine     Chronic Issues-    No past medical history on file.     Past Surgical History:   Procedure Laterality Date    TONSILLECTOMY         Review of patient's allergies indicates:  No Known Allergies    Current Outpatient Medications   Medication Instructions    azithromycin (ZITHROMAX) 500 mg, Oral    docusate sodium (COLACE) 100 mg, Oral, 2 times daily    ibuprofen (ADVIL,MOTRIN) 200 mg, Oral, Every 6 hours PRN    polyethylene glycol (GLYCOLAX) 17 gram PwPk Oral    sulfamethoxazole-trimethoprim 800-160mg (BACTRIM DS) 800-160 mg Tab 1 tablet, Oral, Every 12 hours (non-standard times)       Social History     Socioeconomic History    Marital status:    Tobacco Use    Smoking status: Some Days     Types: Cigarettes    Smokeless tobacco: Never   Substance and Sexual Activity    Alcohol use: Yes     Comment: occassionally    Drug use: Yes     Types: Cocaine    Sexual activity: Yes     Partners: Female        Family History   Problem Relation Name Age of Onset    Cancer Mother      Heart disease Mother      Depression Mother          Patient Care Team:  Polly Fang MD as PCP - General (Family Medicine)     Subjective:     ROS    See HPI for details    Constitutional: Denies Change in appetite. Denies Chills. Denies Fever. Denies Night  "sweats.  Respiratory: Denies Cough. Denies Shortness of breath. Denies Shortness of breath with exertion. Denies Wheezing.  Cardiovascular: DeniesChest pain at rest. Denies Chest pain with exertion. Denies Irregular heartbeat. Denies Palpitations. Denies Edema.  Gastrointestinal: Denies Abdominal pain. DeniesDiarrhea. Denies Nausea. Denies Vomiting. Denies Hematemesis or Hematochezia.  Genitourinary: Denies Dysuria. Denies Urinary frequency. Denies Urinary urgency. Denies Blood in urine.  Endocrine: Denies Cold intolerance. Denies Excessive thirst. Denies Heat intolerance. Denies Weight loss. Denies Weight gain.  Musculoskeletal: Denies Painful joints. Denies Weakness.  Integumentary: Denies Rash. Denies Itching. Denies Dry skin.  Neurologic: Denies Dizziness. Denies Fainting. Denies Headache.  Psychiatric: Denies Depression. Denies Anxiety. Denies Suicidal/Homicidal ideations.    All Other ROS: Negative except as stated in HPI.  Objective:     Visit Vitals  /87 (BP Location: Left arm, Patient Position: Sitting, BP Method: Medium (Automatic))   Pulse 87   Temp 98.6 °F (37 °C) (Oral)   Resp 20   Ht 5' 10.67" (1.795 m)   Wt 66.2 kg (146 lb)   SpO2 98%   BMI 20.55 kg/m²       Physical Exam    General: Alert and oriented, No acute distress.  Respiratory: Clear to auscultation bilaterally; No wheezes, rales or rhonchi,Non-labored respirations, Symmetrical chest wall expansion.  Cardiovascular: Regular rate and rhythm, S1/S2 normal, No murmurs, rubs or gallops.  Gastrointestinal: Soft, Non-tender, Non-distended, Normal bowel sounds, No palpable organomegaly.  Musculoskeletal: Normal range of motion.  Extremities: No clubbing, cyanosis or edema  Neurologic: No focal deficits  Psychiatric: Normal interaction, Coherent speech, Euthymic mood, Appropriate affect   Assessment:       ICD-10-CM ICD-9-CM   1. Leukopenia, unspecified type  D72.819 288.50   2. Anemia, unspecified type  D64.9 285.9   3. Elevated C-reactive " protein (CRP)  R79.82 790.95   4. Elevated serum globulin level  R77.1 790.99   5. Folate deficiency  E53.8 266.2   6. Drug abuse  F19.10 305.90        Plan:     1. Leukopenia, unspecified type  2. Anemia, unspecified type  3. Elevated serum globulin level  -     Lead, Blood; Future; Expected date: 06/05/2024  -     Ambulatory referral/consult to Hematology / Oncology; Future; Expected date: 06/12/2024    4. Folate deficiency  -     folic acid (FOLVITE) 1 MG tablet; Take 1 tablet (1 mg total) by mouth once daily.  Dispense: 90 tablet; Refill: 0    5. Elevated C-reactive protein (CRP)  Likely secondary to epididymo-orchitis    6. Drug abuse  Patient did not disclose to me about cocaine abuse but he did it to the nurse.  I called multiple times to discuss about drug abuse but patient missed the call.  We will address in the next clinic visit         Medication List with Changes/Refills   Current Medications    AZITHROMYCIN (ZITHROMAX) 500 MG TABLET    Take 500 mg by mouth.       Start Date: 5/29/2024 End Date: --    DOCUSATE SODIUM (COLACE) 100 MG CAPSULE    Take 1 capsule (100 mg total) by mouth 2 (two) times daily.       Start Date: 6/3/2024  End Date: 9/1/2024    IBUPROFEN (ADVIL,MOTRIN) 200 MG TABLET    Take 200 mg by mouth every 6 (six) hours as needed for Pain.       Start Date: --        End Date: --    POLYETHYLENE GLYCOL (GLYCOLAX) 17 GRAM PWPK    Take by mouth.       Start Date: --        End Date: --    SULFAMETHOXAZOLE-TRIMETHOPRIM 800-160MG (BACTRIM DS) 800-160 MG TAB    Take 1 tablet by mouth every 12 (twelve) hours.       Start Date: --        End Date: --          Follow up for Please keep the scheduled visit.   In addition to their scheduled follow up, the patient has also been instructed to follow up on as needed basis.

## 2024-06-06 LAB
ALBUMIN % SPEP (OHS): 41.17 (ref 48.1–59.5)
ALBUMIN SERPL-MCNC: 3.4 G/DL (ref 3.5–5)
ALBUMIN/GLOB SERPL: 0.7 RATIO (ref 1.1–2)
ALPHA 1 GLOB (OHS): 0.37 GM/DL (ref 0–0.4)
ALPHA 1 GLOB% (OHS): 4.53 (ref 2.3–4.9)
ALPHA 2 GLOB % (OHS): 14.3 (ref 6.9–13)
ALPHA 2 GLOB (OHS): 1.17 GM/DL (ref 0.4–1)
ANTINUCLEAR ANTIBODY SCREEN (OHS): POSITIVE
BETA GLOB (OHS): 1.18 GM/DL (ref 0.7–1.3)
BETA GLOB% (OHS): 14.39 (ref 13.8–19.7)
CENTROMERE QUANT (OHS): 48 U/ML
DSDNA AB QUANT (OHS): 1.6 IU/ML
GAMMA GLOBULIN % (OHS): 25.61 (ref 10.1–21.9)
GAMMA GLOBULIN (OHS): 2.1 GM/DL (ref 0.4–1.8)
GLOBULIN SER-MCNC: 4.8 GM/DL (ref 2.4–3.5)
JO-1 AB QUANT (OHS): 0.4 U/ML
KAPPA LC FREE SER NEPH-MCNC: 3.18 MG/DL (ref 0.33–1.94)
KAPPA LC FREE/LAMBDA FREE SER NEPH: 1.51 {RATIO} (ref 0.26–1.65)
LAMBDA LC FREE SER NEPH-MCNC: 2.1 MG/DL (ref 0.57–2.63)
M SPIKE % (OHS): ABNORMAL
M SPIKE (OHS): ABNORMAL
PATH REV: NORMAL
PROT SERPL-MCNC: 8.2 GM/DL (ref 6.4–8.3)
RHEUMATOID FACTOR IGA QUANTITATIVE (OLG): 5.8 IU/ML
RHEUMATOID FACTOR IGM QUANTITATIVE (OLG): 2.3 IU/ML
RNP70 AB QUANT (OHS): 2.4 U/ML
SCL-70S AB QUANT (OHS): 1.9 U/ML
SMITH AB QUANT (OHS): 1.2 U/ML
SSA(RO) AB QUANT (OHS): 34 U/ML
SSB(LA) AB QUANT (OHS): 0.6 U/ML
U1RNP AB QUANT (OHS): 2.9 U/ML

## 2024-06-07 NOTE — PROGRESS NOTES
Please inform patient of lab results.    Increased globulin level  Referral sent to Hematology  Thanks,    Dr. Fang

## 2024-06-12 ENCOUNTER — TELEPHONE (OUTPATIENT)
Dept: FAMILY MEDICINE | Facility: CLINIC | Age: 49
End: 2024-06-12
Payer: COMMERCIAL

## 2024-06-12 NOTE — TELEPHONE ENCOUNTER
----- Message from Polly Fang MD sent at 6/7/2024  8:59 AM CDT -----  Please inform patient of lab results.    Increased globulin level  Referral sent to Hematology  Thanks,    Dr. Fang

## 2024-06-19 ENCOUNTER — PATIENT MESSAGE (OUTPATIENT)
Facility: CLINIC | Age: 49
End: 2024-06-19
Payer: COMMERCIAL

## 2024-07-17 ENCOUNTER — PATIENT MESSAGE (OUTPATIENT)
Facility: CLINIC | Age: 49
End: 2024-07-17
Payer: COMMERCIAL

## 2024-10-29 ENCOUNTER — PATIENT MESSAGE (OUTPATIENT)
Facility: CLINIC | Age: 49
End: 2024-10-29
Payer: COMMERCIAL

## 2024-12-02 ENCOUNTER — HOSPITAL ENCOUNTER (EMERGENCY)
Facility: HOSPITAL | Age: 49
Discharge: HOME | End: 2024-12-02
Attending: STUDENT IN AN ORGANIZED HEALTH CARE EDUCATION/TRAINING PROGRAM
Payer: COMMERCIAL

## 2024-12-02 VITALS
RESPIRATION RATE: 18 BRPM | WEIGHT: 145 LBS | HEART RATE: 78 BPM | BODY MASS INDEX: 20.76 KG/M2 | HEIGHT: 70 IN | SYSTOLIC BLOOD PRESSURE: 137 MMHG | OXYGEN SATURATION: 98 % | DIASTOLIC BLOOD PRESSURE: 85 MMHG | TEMPERATURE: 98 F

## 2024-12-02 DIAGNOSIS — A08.4 VIRAL GASTROENTERITIS: Primary | ICD-10-CM

## 2024-12-02 DIAGNOSIS — Z76.89 RETURN TO WORK EVALUATION: ICD-10-CM

## 2024-12-02 PROCEDURE — 99281 EMR DPT VST MAYX REQ PHY/QHP: CPT

## 2024-12-02 NOTE — Clinical Note
"Katus "Katparish Greene was seen and treated in our emergency department on 12/2/2024.  He may return to work on 12/03/2024.       If you have any questions or concerns, please don't hesitate to call.      Arjun Aggarwal MD"

## 2024-12-02 NOTE — ED PROVIDER NOTES
Encounter Date: 12/2/2024      History     Chief Complaint   Patient presents with    Letter for School/Work     Needs a work excuse because last night he had abdominal pain and diarrhea and could not go to work today. Reports his symptoms have resolved     Yamile Greene is a 49 y.o. male who presented to [facility] ED on 12/2/2024 with a primary complaint of abdominal pain and diarrhea last night however symptoms have resolved.  Patient is needing a excuse.    Past Medical History:  He  has no past medical history on file.    Past Surgical History:  He  has a past surgical history that includes Tonsillectomy.    Allergies:  Review of patient's allergies indicates:  No Known Allergies    Family History:  His family history includes Cancer in his mother; Depression in his mother; Heart disease in his mother.    Social History:  He  reports that he has been smoking cigarettes. He has never used smokeless tobacco. He reports current alcohol use. He reports current drug use. Drug: Cocaine.    Home Medications:  He has a current medication list which includes the following prescription(s): azithromycin, folic acid, ibuprofen, polyethylene glycol, and sulfamethoxazole-trimethoprim 800-160mg.     ROS  Pertinent positives and negatives listed in HPI. All other systems are reviewed and are negative.    Physical Exam     Initial Vitals [12/02/24 1632]   BP Pulse Resp Temp SpO2   137/85 78 18 98.4 °F (36.9 °C) 98 %      MAP       --         General: Awake, alert, & oriented to person, place & time. No acute distress  Psychiatric: Mood and affect normal  HEENT: Normocephalic, atraumatic. Face symmetric.  Cardiovascular: Regular rate & rhythm  Pulmonary: Bilateral symmetric chest rise, Non-labored  Abdominal:  nondistended, normal bowel sounds  Extremities: No clubbing or cyanosis.  Skin:  Exposed skin is warm & dry.  Neuro:   Patient moves all extremities equally. Sensation intact bilateraly.    ED Course   Procedures  Labs Reviewed -  No data to display       Imaging Results    None          Medications - No data to display  Medical Decision Making    Yamile Greene is a 49 y.o. male who presented to [facility] ED on 12/2/2024 with a primary complaint of abdominal pain and diarrhea last night however symptoms have resolved.  Patient is needing a excuse.    Patient was discharged with instructions to come back if symptoms return, physical exam was unremarkable.    Amount and/or Complexity of Data Reviewed  External Data Reviewed:  Notes, labs  Labs:  All labs that have been ordered have been reviewed and are documented in ED Course.  Radiology: All images that have been ordered have been reviewed and are documented in ED Course.         Ddx: Appendicitis, Diverticulitis, Pancreatitis, Pyelonephritis, AAA, Dissection, MI, Gastric Ulcer, Peptic Ulcer, Urinary retention, among others                              Clinical Impression:  Final diagnoses:  [A08.4] Viral gastroenteritis (Primary)  [Z76.89] Return to work evaluation            ED Disposition Condition    Discharge Stable          ED Prescriptions    None       Follow-up Information       Follow up With Specialties Details Why Contact Info    Polly Fang MD Family Medicine Schedule an appointment as soon as possible for a visit in 1 day  [address], [facility] [place] [address]  [facility] [address] [phone]      [facility] - Emergency Dept Emergency Medicine  If symptoms worsen [address] [facility] [id number] [phone]             Arjun Aggarwal MD  12/02/24 5010

## 2025-02-26 ENCOUNTER — HOSPITAL ENCOUNTER (EMERGENCY)
Facility: HOSPITAL | Age: 50
Discharge: HOME | End: 2025-02-26
Attending: EMERGENCY MEDICINE
Payer: COMMERCIAL

## 2025-02-26 VITALS
DIASTOLIC BLOOD PRESSURE: 71 MMHG | HEART RATE: 80 BPM | OXYGEN SATURATION: 99 % | HEIGHT: 70 IN | RESPIRATION RATE: 18 BRPM | WEIGHT: 145 LBS | SYSTOLIC BLOOD PRESSURE: 116 MMHG | BODY MASS INDEX: 20.76 KG/M2 | TEMPERATURE: 98 F

## 2025-02-26 DIAGNOSIS — Z02.89 ENCOUNTER TO OBTAIN EXCUSE FROM WORK: Primary | ICD-10-CM

## 2025-02-26 PROCEDURE — 99281 EMR DPT VST MAYX REQ PHY/QHP: CPT

## 2025-02-26 NOTE — ED PROVIDER NOTES
Encounter Date: 2/26/2025      History     Chief Complaint   Patient presents with    Letter for School/Work     Had a cough over the weekend. Needs an excuse to go back to work.      HPI: Please see MDM    Past Medical History:  He  has no past medical history on file.    Past Surgical History:  He  has a past surgical history that includes Tonsillectomy.    Allergies:  Review of patient's allergies indicates:  No Known Allergies    Family History:  His family history includes Cancer in his mother; Depression in his mother; Heart disease in his mother.    Social History:  He  reports that he has been smoking cigarettes. He has never used smokeless tobacco. He reports current alcohol use. He reports current drug use. Drug: Cocaine.    Home Medications:  He has a current medication list which includes the following prescription(s): azithromycin, folic acid, ibuprofen, polyethylene glycol, and sulfamethoxazole-trimethoprim 800-160mg.     ROS  Pertinent positives and negatives listed in HPI. All other systems are reviewed and are negative.    Physical Exam     Initial Vitals   BP Pulse Resp Temp SpO2   02/26/25 1228 02/26/25 1226 02/26/25 1226 02/26/25 1226 02/26/25 1226   116/71 84 18 98.3 °F (36.8 °C) 99 %      MAP       --                General: No acute distress  HEENT: Normocephalic, atraumatic. Face symmetric.   Cardiovascular: Regular rate & rhythm  Pulmonary: Bilateral symmetric chest rise, Non-labored.    Abdominal:  nondistended  Extremities: No clubbing or cyanosis.  Skin:  Exposed skin is warm & dry.  Neuro:   Patient moves all extremities equally. Sensation intact bilateraly.    ED Course   Procedures  Labs Reviewed - No data to display       Imaging Results    None          Medications - No data to display  Medical Decision Making         Amount and/or Complexity of Data Reviewed  External Data Reviewed:  Notes, labs  Labs:  All labs that have been ordered have been reviewed and are documented in ED  Course.  Radiology: All images that have been ordered have been reviewed and are documented in ED Course.               The patient is resting comfortably and is in no acute distress.  Patient states that his symptoms have improved after treatment within ER. Discussed test results, shared treatment plan, specific conditions for return, and importance of follow up with patient and family. Advised to complete his treatment with   as well. The patient understands and agrees with the plan as discussed. Answered all patient questions at this time.He has remained hemodynamically stable throughout the ED course and is appropriate for discharge home.                           Clinical Impression:  Final diagnoses:  [Z02.89] Encounter to obtain excuse from work (Primary)            ED Disposition Condition    Discharge Stable          ED Prescriptions    None       Follow-up Information       Follow up With Specialties Details Why Contact Info    Polly Fang MD Family Medicine  As needed [address] [address]  [phone]               Froilan Kilgore MD  03/05/25 7431

## 2025-02-26 NOTE — ED NOTES
Asking when will he go back. Informed patients are in all rooms and when we get a room we will bring back as soon as possible

## 2025-02-26 NOTE — ED NOTES
Registration just called and said pt is back in lobby. Informed he loss his spot and brought someone back. He will need to wait until next spot opens

## 2025-02-26 NOTE — ED NOTES
Called back to room/ pt not in lobby or outside/ registration informed he left to go outside when I spoke to him earlier and never came back

## 2025-02-26 NOTE — Clinical Note
"Yamile"Guru Greene was seen and treated in our emergency department on 2/26/2025.  He may return with no restrictions on 02/27/2025.       Sincerely,      Froilan Kilgore MD    "

## 2025-08-27 ENCOUNTER — TELEPHONE (OUTPATIENT)
Dept: FAMILY MEDICINE | Facility: CLINIC | Age: 50
End: 2025-08-27
Payer: COMMERCIAL

## 2025-08-28 ENCOUNTER — TELEPHONE (OUTPATIENT)
Dept: FAMILY MEDICINE | Facility: CLINIC | Age: 50
End: 2025-08-28
Payer: COMMERCIAL